# Patient Record
Sex: FEMALE | Race: BLACK OR AFRICAN AMERICAN | Employment: FULL TIME | URBAN - METROPOLITAN AREA
[De-identification: names, ages, dates, MRNs, and addresses within clinical notes are randomized per-mention and may not be internally consistent; named-entity substitution may affect disease eponyms.]

---

## 2021-11-23 ENCOUNTER — OFFICE VISIT (OUTPATIENT)
Dept: INTERNAL MEDICINE CLINIC | Age: 26
End: 2021-11-23
Payer: COMMERCIAL

## 2021-11-23 VITALS
SYSTOLIC BLOOD PRESSURE: 118 MMHG | DIASTOLIC BLOOD PRESSURE: 80 MMHG | WEIGHT: 182 LBS | TEMPERATURE: 98.4 F | HEART RATE: 98 BPM | OXYGEN SATURATION: 99 % | RESPIRATION RATE: 18 BRPM | BODY MASS INDEX: 26.05 KG/M2 | HEIGHT: 70 IN

## 2021-11-23 DIAGNOSIS — R11.0 NAUSEA: ICD-10-CM

## 2021-11-23 DIAGNOSIS — Z30.09 BIRTH CONTROL COUNSELING: ICD-10-CM

## 2021-11-23 DIAGNOSIS — R42 DIZZY: ICD-10-CM

## 2021-11-23 DIAGNOSIS — E55.9 VITAMIN D DEFICIENCY: ICD-10-CM

## 2021-11-23 DIAGNOSIS — G43.101 MIGRAINE WITH AURA AND WITH STATUS MIGRAINOSUS, NOT INTRACTABLE: ICD-10-CM

## 2021-11-23 DIAGNOSIS — Z00.00 ENCOUNTER FOR MEDICAL EXAMINATION TO ESTABLISH CARE: Primary | ICD-10-CM

## 2021-11-23 PROCEDURE — 99203 OFFICE O/P NEW LOW 30 MIN: CPT | Performed by: INTERNAL MEDICINE

## 2021-11-23 RX ORDER — DICLOFENAC SODIUM 75 MG/1
75 TABLET, DELAYED RELEASE ORAL
COMMUNITY

## 2021-11-23 RX ORDER — BUTALBITAL, ASPIRIN, AND CAFFEINE 325; 50; 40 MG/1; MG/1; MG/1
1 CAPSULE ORAL
Qty: 15 CAPSULE | Refills: 0 | Status: SHIPPED | OUTPATIENT
Start: 2021-11-23

## 2021-11-23 RX ORDER — NORGESTIMATE AND ETHINYL ESTRADIOL 7DAYSX3 28
1 KIT ORAL
COMMUNITY
End: 2021-11-23 | Stop reason: SDUPTHER

## 2021-11-23 RX ORDER — NORGESTIMATE AND ETHINYL ESTRADIOL 7DAYSX3 28
1 KIT ORAL DAILY
Qty: 3 DOSE PACK | Refills: 2 | Status: SHIPPED | OUTPATIENT
Start: 2021-11-23 | End: 2022-08-15 | Stop reason: SDUPTHER

## 2021-11-23 RX ORDER — BUTALBITAL, ASPIRIN, AND CAFFEINE 325; 50; 40 MG/1; MG/1; MG/1
1 CAPSULE ORAL
COMMUNITY
End: 2021-11-23 | Stop reason: SDUPTHER

## 2021-11-23 NOTE — PROGRESS NOTES
HISTORY OF PRESENT ILLNESS  Fernandez Son is a 23 y.o. female. Patient was seen to establish care. Reports no PMH outside of migraines. Is taking fiorinal for this a few times a week. Also reports that since October she has been having some dizzy spells. Reports that it begins with nausea. And in the fast few times a HA comes after. Also reports that her legs gave out once, but believes that she locked them chris to her weakness. Has had to take OTC anti nausea for this a few times. Denies any visual changes. Is seeing PT for multi directional pain to her shoulder. Attends weekly and is taking Voltaren for this. Had a PAP done last year and did receive the HPV vaccine  Visit Vitals  /80 (BP 1 Location: Right arm, BP Patient Position: Sitting, BP Cuff Size: Adult)   Pulse 98   Temp 98.4 °F (36.9 °C) (Oral)   Resp 18   Ht 5' 10\" (1.778 m)   Wt 182 lb (82.6 kg)   LMP 11/09/2021   SpO2 99%   BMI 26.11 kg/m²   History reviewed. No pertinent past medical history. History reviewed. No pertinent surgical history. Family History   Problem Relation Age of Onset   Kristina See Asthma Mother     Hypertension Father     Asthma Sister     Cancer Maternal Grandmother         stomach CA    Heart Disease Paternal Grandfather         valve disorder      Outpatient Encounter Medications as of 11/23/2021   Medication Sig Dispense Refill    diclofenac EC (VOLTAREN) 75 mg EC tablet Take 75 mg by mouth.  norgestimate-ethinyl estradioL ([facility], 28,) 0.18/0.215/0.25 mg-35 mcg (28) tab Take 1 Tablet by mouth daily. 3 Dose Pack 2    butalbital-aspirin-caffeine (FIORINAL) capsule Take 1 Capsule by mouth every four (4) hours as needed for Headache. Max Daily Amount: 6 Capsules. 15 Capsule 0    [DISCONTINUED] butalbital-aspirin-caffeine (FIORINAL) capsule Take 1 Capsule by mouth every four (4) hours as needed for Headache.       [DISCONTINUED] norgestimate-ethinyl estradioL ([facility], 28,) 0.18/0.215/0.25 mg-35 mcg (28) tab Take 1 Tablet by mouth. No facility-administered encounter medications on file as of 11/23/2021. HPI    Review of Systems   Constitutional: Negative. HENT: Negative. Eyes: Negative. Respiratory: Negative. Cardiovascular: Negative. Gastrointestinal: Positive for nausea. Genitourinary: Negative. Musculoskeletal: Negative. Neurological: Positive for dizziness and headaches. Negative for speech change. Psychiatric/Behavioral: Negative. Physical Exam  Vitals and nursing note reviewed. HENT:      Head: Normocephalic. Right Ear: Tympanic membrane normal.      Left Ear: Tympanic membrane normal.   Eyes:      Pupils: Pupils are equal, round, and reactive to light. Cardiovascular:      Rate and Rhythm: Normal rate and regular rhythm. Pulmonary:      Effort: Pulmonary effort is normal.      Breath sounds: Normal breath sounds. Abdominal:      General: Bowel sounds are normal.      Palpations: Abdomen is soft. Musculoskeletal:      Right shoulder: Tenderness present. No swelling. Normal strength. Normal pulse. Left shoulder: Normal.   Skin:     General: Skin is warm. Neurological:      Mental Status: She is alert and oriented to person, place, and time. Psychiatric:         Behavior: Behavior normal.         ASSESSMENT and PLAN  Diagnoses and all orders for this visit:    1. Encounter for medical examination to establish care  -     CBC WITH AUTOMATED DIFF; Future  -     METABOLIC PANEL, COMPREHENSIVE; Future    2. Vitamin D deficiency  -     VITAMIN D, 25 HYDROXY; Future    3. Nausea  -     URINALYSIS W/MICROSCOPIC; Future    4. Dizzy  -     TSH 3RD GENERATION; Future  -     URINALYSIS W/MICROSCOPIC; Future    5. Migraine with aura and with status migrainosus, not intractable  -     butalbital-aspirin-caffeine (FIORINAL) capsule; Take 1 Capsule by mouth every four (4) hours as needed for Headache. Max Daily Amount: 6 Capsules.     6. Birth control counseling  -     norgestimate-ethinyl estradioL ([facility], 28,) 0.18/0.215/0.25 mg-35 mcg (28) tab; Take 1 Tablet by mouth daily.       Follow-up and Dispositions    · Return in 1 year (on 11/23/2022), or if symptoms worsen or fail to improve.       lab results and schedule of future lab studies reviewed with patient  reviewed diet, exercise and weight control  reviewed medications and side effects in detail

## 2021-11-24 ENCOUNTER — TELEPHONE (OUTPATIENT)
Dept: INTERNAL MEDICINE CLINIC | Age: 26
End: 2021-11-24

## 2021-11-24 DIAGNOSIS — N39.0 URINARY TRACT INFECTION WITHOUT HEMATURIA, SITE UNSPECIFIED: Primary | ICD-10-CM

## 2021-11-24 LAB
25(OH)D3+25(OH)D2 SERPL-MCNC: 42.5 NG/ML (ref 30–100)
ALBUMIN SERPL-MCNC: 4.4 G/DL (ref 3.9–5)
ALBUMIN/GLOB SERPL: 1.5 {RATIO} (ref 1.2–2.2)
ALP SERPL-CCNC: 48 IU/L (ref 44–121)
ALT SERPL-CCNC: 12 IU/L (ref 0–32)
APPEARANCE UR: CLEAR
AST SERPL-CCNC: 29 IU/L (ref 0–40)
BACTERIA #/AREA URNS HPF: ABNORMAL /[HPF]
BASOPHILS # BLD AUTO: 0 X10E3/UL (ref 0–0.2)
BASOPHILS NFR BLD AUTO: 0 %
BILIRUB SERPL-MCNC: 0.3 MG/DL (ref 0–1.2)
BILIRUB UR QL STRIP: NEGATIVE
BUN SERPL-MCNC: 6 MG/DL (ref 6–20)
BUN/CREAT SERPL: 8 (ref 9–23)
CALCIUM SERPL-MCNC: 9.6 MG/DL (ref 8.7–10.2)
CASTS URNS QL MICRO: ABNORMAL /LPF
CHLORIDE SERPL-SCNC: 102 MMOL/L (ref 96–106)
CO2 SERPL-SCNC: 23 MMOL/L (ref 20–29)
COLOR UR: YELLOW
CREAT SERPL-MCNC: 0.72 MG/DL (ref 0.57–1)
EOSINOPHIL # BLD AUTO: 0 X10E3/UL (ref 0–0.4)
EOSINOPHIL NFR BLD AUTO: 0 %
EPI CELLS #/AREA URNS HPF: ABNORMAL /HPF (ref 0–10)
ERYTHROCYTE [DISTWIDTH] IN BLOOD BY AUTOMATED COUNT: 12.8 % (ref 11.7–15.4)
GLOBULIN SER CALC-MCNC: 3 G/DL (ref 1.5–4.5)
GLUCOSE SERPL-MCNC: 71 MG/DL (ref 65–99)
GLUCOSE UR QL: NEGATIVE
HCT VFR BLD AUTO: 35.6 % (ref 34–46.6)
HGB BLD-MCNC: 12 G/DL (ref 11.1–15.9)
HGB UR QL STRIP: NEGATIVE
IMM GRANULOCYTES # BLD AUTO: 0 X10E3/UL (ref 0–0.1)
IMM GRANULOCYTES NFR BLD AUTO: 0 %
KETONES UR QL STRIP: ABNORMAL
LEUKOCYTE ESTERASE UR QL STRIP: NEGATIVE
LYMPHOCYTES # BLD AUTO: 1.4 X10E3/UL (ref 0.7–3.1)
LYMPHOCYTES NFR BLD AUTO: 28 %
MCH RBC QN AUTO: 29.6 PG (ref 26.6–33)
MCHC RBC AUTO-ENTMCNC: 33.7 G/DL (ref 31.5–35.7)
MCV RBC AUTO: 88 FL (ref 79–97)
MICRO URNS: ABNORMAL
MONOCYTES # BLD AUTO: 0.3 X10E3/UL (ref 0.1–0.9)
MONOCYTES NFR BLD AUTO: 6 %
NEUTROPHILS # BLD AUTO: 3.1 X10E3/UL (ref 1.4–7)
NEUTROPHILS NFR BLD AUTO: 66 %
NITRITE UR QL STRIP: NEGATIVE
PH UR STRIP: 6.5 [PH] (ref 5–7.5)
PLATELET # BLD AUTO: 323 X10E3/UL (ref 150–450)
POTASSIUM SERPL-SCNC: 4 MMOL/L (ref 3.5–5.2)
PROT SERPL-MCNC: 7.4 G/DL (ref 6–8.5)
PROT UR QL STRIP: ABNORMAL
RBC # BLD AUTO: 4.05 X10E6/UL (ref 3.77–5.28)
RBC #/AREA URNS HPF: ABNORMAL /HPF (ref 0–2)
SODIUM SERPL-SCNC: 140 MMOL/L (ref 134–144)
SP GR UR: 1.03 (ref 1–1.03)
TSH SERPL DL<=0.005 MIU/L-ACNC: 1.25 UIU/ML (ref 0.45–4.5)
UROBILINOGEN UR STRIP-MCNC: 1 MG/DL (ref 0.2–1)
WBC # BLD AUTO: 4.8 X10E3/UL (ref 3.4–10.8)
WBC #/AREA URNS HPF: ABNORMAL /HPF (ref 0–5)

## 2021-11-24 RX ORDER — NITROFURANTOIN 25; 75 MG/1; MG/1
100 CAPSULE ORAL 2 TIMES DAILY
Qty: 10 CAPSULE | Refills: 0 | Status: SHIPPED | OUTPATIENT
Start: 2021-11-24 | End: 2021-11-29

## 2021-11-24 NOTE — TELEPHONE ENCOUNTER
----- Message from Steve Dykes NP sent at 11/24/2021 12:20 PM EST -----  Labs are stable. Urine does show that she has a lot of bacteria. Needs to take macrobid 100 mg BID for 5 days.  Drink a lot of water

## 2021-11-24 NOTE — PROGRESS NOTES
Labs are stable. Urine does show that she has a lot of bacteria. Needs to take macrobid 100 mg BID for 5 days.  Drink a lot of water

## 2022-08-15 ENCOUNTER — VIRTUAL VISIT (OUTPATIENT)
Dept: INTERNAL MEDICINE CLINIC | Age: 27
End: 2022-08-15
Payer: COMMERCIAL

## 2022-08-15 DIAGNOSIS — Z30.09 BIRTH CONTROL COUNSELING: ICD-10-CM

## 2022-08-15 PROCEDURE — 99442 PR PHYS/QHP TELEPHONE EVALUATION 11-20 MIN: CPT | Performed by: INTERNAL MEDICINE

## 2022-08-15 RX ORDER — NORGESTIMATE AND ETHINYL ESTRADIOL 7DAYSX3 28
1 KIT ORAL DAILY
Qty: 3 DOSE PACK | Refills: 2 | Status: SHIPPED | OUTPATIENT
Start: 2022-08-15

## 2022-08-15 NOTE — PROGRESS NOTES
Dario Souza is a 27 y.o. female, evaluated via audio-only technology on 8/15/2022 for Medication Refill  . Assessment & Plan:   Diagnoses and all orders for this visit:    1. Birth control counseling  -     norgestimate-ethinyl estradioL ([facility], 28,) 0.18/0.215/0.25 mg-35 mcg (28) tab; Take 1 Tablet by mouth in the morning. Follow-up and Dispositions    Return if symptoms worsen or fail to improve. 12  Subjective:     Patient was spoken to via phone. Reports that she needs a refill on her BC. No concerns at this time. No spotting. Is having an vaginal itching. This all began after scratching an area. Was given a topical at [facility] a few weeks ago and this happen. Letting the area the area breath does help. No discharge or signs of infection. Wants to come in and have this assessed   Prior to Admission medications    Medication Sig Start Date End Date Taking? Authorizing Provider   norgestimate-ethinyl estradioL ([facility], 28,) 0.18/0.215/0.25 mg-35 mcg (28) tab Take 1 Tablet by mouth in the morning. 8/15/22  Yes Viky Yates NP   diclofenac EC (VOLTAREN) 75 mg EC tablet Take 75 mg by mouth. Yes Provider, Historical   butalbital-aspirin-caffeine (FIORINAL) capsule Take 1 Capsule by mouth every four (4) hours as needed for Headache. Max Daily Amount: 6 Capsules. 11/23/21  Yes Viky Yates NP   norgestimate-ethinyl estradioL ([facility], 28,) 0.18/0.215/0.25 mg-35 mcg (28) tab Take 1 Tablet by mouth daily. 11/23/21 8/15/22  Binta Paz NP     There is no problem list on file for this patient. There are no problems to display for this patient. Current Outpatient Medications   Medication Sig Dispense Refill    norgestimate-ethinyl estradioL ([facility], 28,) 0.18/0.215/0.25 mg-35 mcg (28) tab Take 1 Tablet by mouth in the morning. 3 Dose Pack 2    diclofenac EC (VOLTAREN) 75 mg EC tablet Take 75 mg by mouth.       butalbital-aspirin-caffeine [facility]) capsule Take 1 Capsule by mouth every four (4) hours as needed for Headache. Max Daily Amount: 6 Capsules. 15 Capsule 0     Allergies   Allergen Reactions    Penicillins Itching     History reviewed. No pertinent past medical history. History reviewed. No pertinent surgical history. Family History   Problem Relation Age of Onset    Asthma Mother     Hypertension Father     Asthma Sister     Cancer Maternal Grandmother         stomach CA    Heart Disease Paternal Grandfather         valve disorder      Social History     Tobacco Use    Smoking status: Never    Smokeless tobacco: Never   Substance Use Topics    Alcohol use: Yes       Review of Systems   Constitutional: Negative. Respiratory: Negative. Cardiovascular: Negative. Gastrointestinal: Negative. Genitourinary:  Negative for dysuria and frequency. Vaginal itchy    Neurological: Negative. No data recorded     Briana Reyes was evaluated through a patient-initiated, synchronous (real-time) audio only encounter. She (or guardian if applicable) is aware that it is a billable service, which includes applicable co-pays, with coverage as determined by her insurance carrier. This visit was conducted with the patient's (and/or Xu Beltran guardian's) verbal consent. She has not had a related appointment within my department in the past 7 days or scheduled within the next 24 hours. The patient was located in a state where the provider was licensed to provide care. The patient was located at: Home: [address] [id number]  The provider was located at:  Other: office    Total Time: minutes: 12 min     Yayo Rea NP